# Patient Record
Sex: FEMALE | Race: WHITE | NOT HISPANIC OR LATINO
[De-identification: names, ages, dates, MRNs, and addresses within clinical notes are randomized per-mention and may not be internally consistent; named-entity substitution may affect disease eponyms.]

---

## 2017-03-31 ENCOUNTER — APPOINTMENT (OUTPATIENT)
Dept: ORTHOPEDIC SURGERY | Facility: CLINIC | Age: 42
End: 2017-03-31

## 2017-03-31 VITALS — BODY MASS INDEX: 20.14 KG/M2 | HEIGHT: 64 IN | RESPIRATION RATE: 16 BRPM | WEIGHT: 118 LBS

## 2017-03-31 DIAGNOSIS — Z78.9 OTHER SPECIFIED HEALTH STATUS: ICD-10-CM

## 2017-04-24 ENCOUNTER — APPOINTMENT (OUTPATIENT)
Dept: ORTHOPEDIC SURGERY | Facility: CLINIC | Age: 42
End: 2017-04-24

## 2017-04-24 VITALS — RESPIRATION RATE: 16 BRPM | WEIGHT: 118 LBS | BODY MASS INDEX: 20.14 KG/M2 | HEIGHT: 64 IN

## 2017-04-24 DIAGNOSIS — S62.525D NONDISPLACED FRACTURE OF DISTAL PHALANX OF LEFT THUMB, SUBSEQUENT ENCOUNTER FOR FRACTURE WITH ROUTINE HEALING: ICD-10-CM

## 2023-12-15 ENCOUNTER — ASOB RESULT (OUTPATIENT)
Age: 48
End: 2023-12-15

## 2023-12-15 ENCOUNTER — APPOINTMENT (OUTPATIENT)
Dept: ANTEPARTUM | Facility: CLINIC | Age: 48
End: 2023-12-15
Payer: COMMERCIAL

## 2023-12-15 PROCEDURE — 93976 VASCULAR STUDY: CPT

## 2023-12-15 PROCEDURE — 76813 OB US NUCHAL MEAS 1 GEST: CPT

## 2023-12-15 PROCEDURE — 36415 COLL VENOUS BLD VENIPUNCTURE: CPT

## 2024-01-17 ENCOUNTER — ASOB RESULT (OUTPATIENT)
Age: 49
End: 2024-01-17

## 2024-01-17 ENCOUNTER — APPOINTMENT (OUTPATIENT)
Dept: ANTEPARTUM | Facility: CLINIC | Age: 49
End: 2024-01-17
Payer: COMMERCIAL

## 2024-01-17 PROCEDURE — 76805 OB US >/= 14 WKS SNGL FETUS: CPT

## 2024-01-17 PROCEDURE — 76817 TRANSVAGINAL US OBSTETRIC: CPT

## 2024-02-15 ENCOUNTER — APPOINTMENT (OUTPATIENT)
Dept: ANTEPARTUM | Facility: CLINIC | Age: 49
End: 2024-02-15
Payer: COMMERCIAL

## 2024-02-15 ENCOUNTER — ASOB RESULT (OUTPATIENT)
Age: 49
End: 2024-02-15

## 2024-02-15 PROCEDURE — 76817 TRANSVAGINAL US OBSTETRIC: CPT

## 2024-02-15 PROCEDURE — 76811 OB US DETAILED SNGL FETUS: CPT

## 2024-03-18 ENCOUNTER — APPOINTMENT (OUTPATIENT)
Dept: ANTEPARTUM | Facility: CLINIC | Age: 49
End: 2024-03-18
Payer: COMMERCIAL

## 2024-03-18 ENCOUNTER — ASOB RESULT (OUTPATIENT)
Age: 49
End: 2024-03-18

## 2024-03-18 PROCEDURE — 76816 OB US FOLLOW-UP PER FETUS: CPT

## 2024-03-18 PROCEDURE — 76819 FETAL BIOPHYS PROFIL W/O NST: CPT

## 2024-03-18 PROCEDURE — 76820 UMBILICAL ARTERY ECHO: CPT | Mod: 59

## 2024-04-15 ENCOUNTER — APPOINTMENT (OUTPATIENT)
Dept: ANTEPARTUM | Facility: CLINIC | Age: 49
End: 2024-04-15
Payer: COMMERCIAL

## 2024-04-15 ENCOUNTER — ASOB RESULT (OUTPATIENT)
Age: 49
End: 2024-04-15

## 2024-04-15 PROCEDURE — 76816 OB US FOLLOW-UP PER FETUS: CPT

## 2024-04-15 PROCEDURE — 76820 UMBILICAL ARTERY ECHO: CPT | Mod: 59

## 2024-04-15 PROCEDURE — 76819 FETAL BIOPHYS PROFIL W/O NST: CPT

## 2024-05-13 ENCOUNTER — ASOB RESULT (OUTPATIENT)
Age: 49
End: 2024-05-13

## 2024-05-13 ENCOUNTER — APPOINTMENT (OUTPATIENT)
Dept: ANTEPARTUM | Facility: CLINIC | Age: 49
End: 2024-05-13
Payer: COMMERCIAL

## 2024-05-13 PROCEDURE — 76816 OB US FOLLOW-UP PER FETUS: CPT

## 2024-05-13 PROCEDURE — 76819 FETAL BIOPHYS PROFIL W/O NST: CPT

## 2024-05-13 PROCEDURE — 76820 UMBILICAL ARTERY ECHO: CPT | Mod: 59

## 2024-05-22 ENCOUNTER — APPOINTMENT (OUTPATIENT)
Dept: ANTEPARTUM | Facility: CLINIC | Age: 49
End: 2024-05-22
Payer: COMMERCIAL

## 2024-05-22 ENCOUNTER — ASOB RESULT (OUTPATIENT)
Age: 49
End: 2024-05-22

## 2024-05-22 PROCEDURE — 76820 UMBILICAL ARTERY ECHO: CPT

## 2024-05-22 PROCEDURE — 76818 FETAL BIOPHYS PROFILE W/NST: CPT

## 2024-05-22 PROCEDURE — 76815 OB US LIMITED FETUS(S): CPT

## 2024-05-29 ENCOUNTER — ASOB RESULT (OUTPATIENT)
Age: 49
End: 2024-05-29

## 2024-05-29 ENCOUNTER — APPOINTMENT (OUTPATIENT)
Dept: ANTEPARTUM | Facility: CLINIC | Age: 49
End: 2024-05-29
Payer: COMMERCIAL

## 2024-05-29 PROCEDURE — 76816 OB US FOLLOW-UP PER FETUS: CPT

## 2024-05-29 PROCEDURE — 76818 FETAL BIOPHYS PROFILE W/NST: CPT | Mod: 59

## 2024-05-29 PROCEDURE — 76820 UMBILICAL ARTERY ECHO: CPT | Mod: 59

## 2024-06-06 ENCOUNTER — ASOB RESULT (OUTPATIENT)
Age: 49
End: 2024-06-06

## 2024-06-06 ENCOUNTER — APPOINTMENT (OUTPATIENT)
Dept: ANTEPARTUM | Facility: CLINIC | Age: 49
End: 2024-06-06
Payer: COMMERCIAL

## 2024-06-06 PROCEDURE — 76815 OB US LIMITED FETUS(S): CPT

## 2024-06-06 PROCEDURE — 76820 UMBILICAL ARTERY ECHO: CPT

## 2024-06-06 PROCEDURE — 76818 FETAL BIOPHYS PROFILE W/NST: CPT

## 2024-06-10 ENCOUNTER — OUTPATIENT (OUTPATIENT)
Dept: OUTPATIENT SERVICES | Facility: HOSPITAL | Age: 49
LOS: 1 days | End: 2024-06-10
Payer: COMMERCIAL

## 2024-06-10 VITALS
RESPIRATION RATE: 18 BRPM | OXYGEN SATURATION: 100 % | HEART RATE: 59 BPM | DIASTOLIC BLOOD PRESSURE: 85 MMHG | TEMPERATURE: 98 F | SYSTOLIC BLOOD PRESSURE: 132 MMHG

## 2024-06-10 DIAGNOSIS — O26.899 OTHER SPECIFIED PREGNANCY RELATED CONDITIONS, UNSPECIFIED TRIMESTER: ICD-10-CM

## 2024-06-10 LAB
ALBUMIN SERPL ELPH-MCNC: 3.3 G/DL — SIGNIFICANT CHANGE UP (ref 3.3–5)
ALP SERPL-CCNC: 143 U/L — HIGH (ref 40–120)
ALT FLD-CCNC: 15 U/L — SIGNIFICANT CHANGE UP (ref 10–45)
ANION GAP SERPL CALC-SCNC: 12 MMOL/L — SIGNIFICANT CHANGE UP (ref 5–17)
AST SERPL-CCNC: 22 U/L — SIGNIFICANT CHANGE UP (ref 10–40)
BASOPHILS # BLD AUTO: 0.06 K/UL — SIGNIFICANT CHANGE UP (ref 0–0.2)
BASOPHILS NFR BLD AUTO: 1 % — SIGNIFICANT CHANGE UP (ref 0–2)
BILIRUB SERPL-MCNC: 0.2 MG/DL — SIGNIFICANT CHANGE UP (ref 0.2–1.2)
BUN SERPL-MCNC: 11 MG/DL — SIGNIFICANT CHANGE UP (ref 7–23)
CALCIUM SERPL-MCNC: 8.8 MG/DL — SIGNIFICANT CHANGE UP (ref 8.4–10.5)
CHLORIDE SERPL-SCNC: 102 MMOL/L — SIGNIFICANT CHANGE UP (ref 96–108)
CO2 SERPL-SCNC: 19 MMOL/L — LOW (ref 22–31)
CREAT ?TM UR-MCNC: 181 MG/DL — SIGNIFICANT CHANGE UP
CREAT SERPL-MCNC: 0.69 MG/DL — SIGNIFICANT CHANGE UP (ref 0.5–1.3)
EGFR: 107 ML/MIN/1.73M2 — SIGNIFICANT CHANGE UP
EOSINOPHIL # BLD AUTO: 0.14 K/UL — SIGNIFICANT CHANGE UP (ref 0–0.5)
EOSINOPHIL NFR BLD AUTO: 2.2 % — SIGNIFICANT CHANGE UP (ref 0–6)
FIBRINOGEN PPP-MCNC: 546 MG/DL — HIGH (ref 200–445)
GLUCOSE SERPL-MCNC: 79 MG/DL — SIGNIFICANT CHANGE UP (ref 70–99)
HCT VFR BLD CALC: 32.6 % — LOW (ref 34.5–45)
HGB BLD-MCNC: 10.7 G/DL — LOW (ref 11.5–15.5)
IMM GRANULOCYTES NFR BLD AUTO: 0.3 % — SIGNIFICANT CHANGE UP (ref 0–0.9)
LDH SERPL L TO P-CCNC: 204 U/L — SIGNIFICANT CHANGE UP (ref 50–242)
LYMPHOCYTES # BLD AUTO: 1.16 K/UL — SIGNIFICANT CHANGE UP (ref 1–3.3)
LYMPHOCYTES # BLD AUTO: 18.4 % — SIGNIFICANT CHANGE UP (ref 13–44)
MCHC RBC-ENTMCNC: 28.8 PG — SIGNIFICANT CHANGE UP (ref 27–34)
MCHC RBC-ENTMCNC: 32.8 GM/DL — SIGNIFICANT CHANGE UP (ref 32–36)
MCV RBC AUTO: 87.9 FL — SIGNIFICANT CHANGE UP (ref 80–100)
MONOCYTES # BLD AUTO: 0.51 K/UL — SIGNIFICANT CHANGE UP (ref 0–0.9)
MONOCYTES NFR BLD AUTO: 8.1 % — SIGNIFICANT CHANGE UP (ref 2–14)
NEUTROPHILS # BLD AUTO: 4.41 K/UL — SIGNIFICANT CHANGE UP (ref 1.8–7.4)
NEUTROPHILS NFR BLD AUTO: 70 % — SIGNIFICANT CHANGE UP (ref 43–77)
NRBC # BLD: 0 /100 WBCS — SIGNIFICANT CHANGE UP (ref 0–0)
PLATELET # BLD AUTO: 179 K/UL — SIGNIFICANT CHANGE UP (ref 150–400)
POTASSIUM SERPL-MCNC: 4.6 MMOL/L — SIGNIFICANT CHANGE UP (ref 3.5–5.3)
POTASSIUM SERPL-SCNC: 4.6 MMOL/L — SIGNIFICANT CHANGE UP (ref 3.5–5.3)
PROT ?TM UR-MCNC: 19 MG/DL — HIGH (ref 0–12)
PROT SERPL-MCNC: 6.6 G/DL — SIGNIFICANT CHANGE UP (ref 6–8.3)
PROT/CREAT UR-RTO: 0.1 RATIO — SIGNIFICANT CHANGE UP (ref 0–0.2)
RBC # BLD: 3.71 M/UL — LOW (ref 3.8–5.2)
RBC # FLD: 13.4 % — SIGNIFICANT CHANGE UP (ref 10.3–14.5)
SODIUM SERPL-SCNC: 133 MMOL/L — LOW (ref 135–145)
URATE SERPL-MCNC: 6.4 MG/DL — SIGNIFICANT CHANGE UP (ref 2.5–7)
WBC # BLD: 6.3 K/UL — SIGNIFICANT CHANGE UP (ref 3.8–10.5)
WBC # FLD AUTO: 6.3 K/UL — SIGNIFICANT CHANGE UP (ref 3.8–10.5)

## 2024-06-10 PROCEDURE — 99214 OFFICE O/P EST MOD 30 MIN: CPT

## 2024-06-10 PROCEDURE — 85025 COMPLETE CBC W/AUTO DIFF WBC: CPT

## 2024-06-10 PROCEDURE — 76815 OB US LIMITED FETUS(S): CPT

## 2024-06-10 PROCEDURE — 59025 FETAL NON-STRESS TEST: CPT

## 2024-06-10 PROCEDURE — 82570 ASSAY OF URINE CREATININE: CPT

## 2024-06-10 PROCEDURE — 80053 COMPREHEN METABOLIC PANEL: CPT

## 2024-06-10 PROCEDURE — 84156 ASSAY OF PROTEIN URINE: CPT

## 2024-06-10 PROCEDURE — 76818 FETAL BIOPHYS PROFILE W/NST: CPT

## 2024-06-10 PROCEDURE — 36415 COLL VENOUS BLD VENIPUNCTURE: CPT

## 2024-06-10 PROCEDURE — 84550 ASSAY OF BLOOD/URIC ACID: CPT

## 2024-06-10 PROCEDURE — 83615 LACTATE (LD) (LDH) ENZYME: CPT

## 2024-06-10 PROCEDURE — 85384 FIBRINOGEN ACTIVITY: CPT

## 2024-06-10 RX ORDER — VALACYCLOVIR 500 MG/1
1 TABLET, FILM COATED ORAL
Refills: 0 | DISCHARGE

## 2024-06-10 NOTE — OB RN TRIAGE NOTE - FALL HARM RISK - UNIVERSAL INTERVENTIONS
Bed in lowest position, wheels locked, appropriate side rails in place/Call bell, personal items and telephone in reach/Instruct patient to call for assistance before getting out of bed or chair/Non-slip footwear when patient is out of bed/[place] to call system/Physically safe environment - no spills, clutter or unnecessary equipment/Purposeful Proactive Rounding/Room/bathroom lighting operational, light cord in reach

## 2024-06-10 NOTE — OB PROVIDER TRIAGE NOTE - HISTORY OF PRESENT ILLNESS
LINNETTE ZEPEDA is a 50yo G2Po at 38w4d presenting for *.     Ante: Spontaneous conception. NIPT LR. Anatomy scan wnl. Passed GCT. Denies elevated BP. GBS *. EFW *     OBHx: G  GYNHx: denies abnormal pap, STI, fibroids, ovarian cysts  PMHx: denies  meds: PNV  PSH: ***  allergies:     PE:  T(C): 36.8 (06-10-24 @ 19:09), Max: 36.8 (06-10-24 @ 19:09)  HR: 59 (06-10-24 @ 19:09) (59 - 59)  BP: 132/85 (06-10-24 @ 19:09) (132/85 - 132/85)  RR: 18 (06-10-24 @ 19:09) (18 - 18)  SpO2: 100% (06-10-24 @ 19:09) (100% - 100%)  GEN: well-appearing, NAD  PULM: no increased WOB  ABD: soft, nontender, gravid  EXT: mild LE edema  TAUS: ***  ultrasound printed and attached to chart   SVE: ***    FHT: baseline 135, moderate variability, +accels, no decels  TOCO: ctx q4min (not feeling them)  reactive & reassuring       A&P: 50yo G* at * presents *. Fetal status reassuring.   -     D/W    D/W   LINNETTE ZEPEDA is a 48yo G2Po at 38w4d presenting for headache and nausea. The nausea has been ongoing throughout pregnancy, unchanged. Headache is 3/10.   She denies VB. Endorses non-painful [place]-Vasquez contractions. Denies LOF. Endorses FM.    Ante: Spontaneous conception. NIPT LR. Anatomy scan wnl. Passed GCT. Denies elevated BP. GBS *. EFW *     OBHx: G1 MAB 8wks 2021. G2 current  GYNHx: abnormal papx1 10yrs ago, resolved, STI, fibroids, ovarian cysts  PMHx: denies  meds: PNV  PSH: none   allergies: sulfa (hives)    PE:  T(C): 36.8 (06-10-24 @ 19:09), Max: 36.8 (06-10-24 @ 19:09)  HR: 59 (06-10-24 @ 19:09) (59 - 59)  BP: 132/85 (06-10-24 @ 19:09) (132/85 - 132/85)  RR: 18 (06-10-24 @ 19:09) (18 - 18)  SpO2: 100% (06-10-24 @ 19:09) (100% - 100%)  GEN: well-appearing, NAD  PULM: no increased WOB  ABD: soft, nontender, gravid  EXT: mild LE edema  TAUS: ***  ultrasound printed and attached to chart   SVE: ***    FHT: baseline 135, moderate variability, +accels, no decels  TOCO: ctx q4min (not feeling them)  reactive & reassuring       A&P: 48yo G* at * presents *. Fetal status reassuring.   -     D/W    D/W   LINNETTE ZEPEDA is a 50yo G2Po at 38w4d presenting for headache and nausea. The nausea has been ongoing throughout pregnancy, unchanged. She had a headache that started two days ago, at worst 3/10, that resolved after taking t tylenol. She currently neither has headache or nausea and is feeling her normal self. She denies emesis. Denies scotoma, CP, shortness of breath, RUQ pain.  She denies VB. Endorses non-painful [place]-Vasquez contractions. Denies LOF. Endorses FM.    Ante: Spontaneous conception. NIPT LR. Anatomy scan wnl. Passed GCT. Denies elevated BP. GBS+. EFW 2798g on 5/29 (30%)     OBHx: G1 MAB 8wks 2021. G2 current  GYNHx: abnormal papx1 10yrs ago, resolved, STI, fibroids, ovarian cysts  PMHx: denies  meds: PNV  PSH: none   allergies: sulfa (hives)    PE:  T(C): 36.8 (06-10-24 @ 19:09), Max: 36.8 (06-10-24 @ 19:09)  HR: 59 (06-10-24 @ 19:09) (59 - 59)  BP: 132/85 (06-10-24 @ 19:09) (132/85 - 132/85)  RR: 18 (06-10-24 @ 19:09) (18 - 18)  SpO2: 100% (06-10-24 @ 19:09) (100% - 100%)  GEN: well-appearing, NAD  PULM: no increased WOB  ABD: soft, nontender, gravid  EXT: mild LE edema  TAUS: ***  ultrasound printed and attached to chart   SVE: ***    FHT: baseline 135, moderate variability, +accels, no decels  TOCO: ctx q4min (not feeling them)  reactive & reassuring       A&P: 50yo G* at * presents *. Fetal status reassuring.   -     D/W    D/W   LINNETTE ZEPEDA is a 50yo G2Po at 38w4d presenting for headache and nausea on Saturday (two days ago). The nausea has been ongoing throughout pregnancy, and has resolved today. She just ate energy bar in triage with no nausea. She had a headache that started two days ago, at worst 3/10, that resolved after taking tylenol. She currently neither has headache or nausea and is feeling her normal self. She denies emesis. Denies scotoma, CP, shortness of breath, RUQ pain.  She denies VB. Endorses non-painful [place]-Vasquez contractions. Denies LOF. Endorses FM.    Ante: IVF pregnancy, donor egg (age 27), PGS testing normal. NIPT LR. Anatomy scan wnl. Passed GCT. Denies elevated BP. GBS+. EFW 2798g on 5/29 (30%)     OBHx: G1 MAB 8wks 2021. G2 current  GYNHx: abnormal papx1 10yrs ago, resolved, STI, fibroids, ovarian cysts  PMHx: denies  meds: PNV  PSH: none   allergies: sulfa (hives)    PE:  T(C): 36.8 (06-10-24 @ 19:09), Max: 36.8 (06-10-24 @ 19:09)  HR: 59 (06-10-24 @ 19:09) (59 - 59)  BP: 132/85 (06-10-24 @ 19:09) (132/85 - 132/85)  RR: 18 (06-10-24 @ 19:09) (18 - 18)  SpO2: 100% (06-10-24 @ 19:09) (100% - 100%)  GEN: well-appearing, NAD  PULM: no increased WOB  ABD: soft, nontender, gravid  EXT: mild LE edema  TAUS: vertex, BPP 8/8, WHITLEY 10.9cm, posterior placenta  ultrasound printed and attached to chart   SVE: deferred    FHT: baseline 135, moderate variability, +accels, no decels  TOCO: ctx q4min (not feeling them)  reactive & reassuring       A&P: 50yo G2Po at 38w4d presenting for headache and nausea on Saturday (two days ago). All of her symptoms resolved prior to presenting to triage. She has no symptoms. Full PEC labs sent and wnl. Patient is normotensive here and has no history of elevated blood pressure. Fetal status reassuring.   - safe to d/c home   - continue to hydrate, tylenol PRN for headache  - reviewed return precautions including S/S of preeclampsia  - pt has appt with primary OB tomorrow  - all questions answered     D/W Dr. Arzola PGY3   D/W Dr. Andrade attending physician  LINNETTE ZEPEDA is a 50yo G2Po at 38w4d presenting for headache and nausea on Saturday (two days ago). The nausea has been ongoing throughout pregnancy, and has resolved today. She just ate energy bar in triage with no nausea. She had a headache that started two days ago, at worst 3/10, that resolved after taking tylenol. She currently neither has headache or nausea and is feeling her normal self. She denies emesis. Denies scotoma, CP, shortness of breath, RUQ pain.  She denies VB. Endorses non-painful [place]-Vasquez contractions. Denies LOF. Endorses FM.    Ante: IVF pregnancy, donor egg (age 27), PGS testing normal. NIPT LR. Anatomy scan wnl. Passed GCT. Denies elevated BP. GBS+. EFW 2798g on 5/29 (30%)     OBHx: G1 MAB 8wks 2021. G2 current  GYNHx: abnormal papx1 10yrs ago, resolved, STI, fibroids, ovarian cysts  PMHx: denies  meds: PNV  PSH: none   allergies: sulfa (hives)    PE:  T(C): 36.8 (06-10-24 @ 19:09), Max: 36.8 (06-10-24 @ 19:09)  HR: 59 (06-10-24 @ 19:09) (59 - 59)  BP: 132/85 (06-10-24 @ 19:09) (132/85 - 132/85)  RR: 18 (06-10-24 @ 19:09) (18 - 18)  SpO2: 100% (06-10-24 @ 19:09) (100% - 100%)  GEN: well-appearing, NAD  PULM: no increased WOB  ABD: soft, nontender, gravid  EXT: mild LE edema  TAUS: vertex, BPP 8/8, WHITLEY 10.9cm, posterior placenta  ultrasound printed and attached to chart   SVE: deferred    FHT: baseline 135, moderate variability, +accels, no decels  TOCO: ctx q4min (not feeling them)  reactive & reassuring       A&P: 50yo G2Po at 38w4d presenting for headache and nausea on Saturday (two days ago). All of her symptoms resolved prior to presenting to triage. She has no symptoms. Full PEC labs sent and wnl, P:C 0.1. Patient is normotensive here and has no history of elevated blood pressure. Fetal status reassuring.   - safe to d/c home   - continue to hydrate, tylenol PRN for headache  - purchase BP cuff and monitor 1-2x per day at home   - reviewed return precautions including S/S of preeclampsia  - pt has appt with primary OB tomorrow  - all questions answered     D/W Dr. Arzola PGY3   D/W Dr. Andrade attending physician

## 2024-06-12 DIAGNOSIS — R11.0 NAUSEA: ICD-10-CM

## 2024-06-12 DIAGNOSIS — R51.9 HEADACHE, UNSPECIFIED: ICD-10-CM

## 2024-06-12 DIAGNOSIS — O09.523 SUPERVISION OF ELDERLY MULTIGRAVIDA, THIRD TRIMESTER: ICD-10-CM

## 2024-06-12 DIAGNOSIS — O09.293 SUPERVISION OF PREGNANCY WITH OTHER POOR REPRODUCTIVE OR OBSTETRIC HISTORY, THIRD TRIMESTER: ICD-10-CM

## 2024-06-12 DIAGNOSIS — O26.893 OTHER SPECIFIED PREGNANCY RELATED CONDITIONS, THIRD TRIMESTER: ICD-10-CM

## 2024-06-12 DIAGNOSIS — Z3A.38 38 WEEKS GESTATION OF PREGNANCY: ICD-10-CM

## 2024-06-14 ENCOUNTER — ASOB RESULT (OUTPATIENT)
Age: 49
End: 2024-06-14

## 2024-06-14 ENCOUNTER — APPOINTMENT (OUTPATIENT)
Dept: ANTEPARTUM | Facility: CLINIC | Age: 49
End: 2024-06-14
Payer: COMMERCIAL

## 2024-06-14 PROCEDURE — 76816 OB US FOLLOW-UP PER FETUS: CPT

## 2024-06-14 PROCEDURE — 76818 FETAL BIOPHYS PROFILE W/NST: CPT

## 2024-06-14 PROCEDURE — 76820 UMBILICAL ARTERY ECHO: CPT | Mod: 59

## 2024-06-15 PROBLEM — Z78.9 OTHER SPECIFIED HEALTH STATUS: Chronic | Status: ACTIVE | Noted: 2024-06-10

## 2024-06-18 ENCOUNTER — ASOB RESULT (OUTPATIENT)
Age: 49
End: 2024-06-18

## 2024-06-18 ENCOUNTER — APPOINTMENT (OUTPATIENT)
Dept: ANTEPARTUM | Facility: CLINIC | Age: 49
End: 2024-06-18
Payer: COMMERCIAL

## 2024-06-18 PROCEDURE — 76818 FETAL BIOPHYS PROFILE W/NST: CPT

## 2024-06-18 PROCEDURE — 76815 OB US LIMITED FETUS(S): CPT

## 2024-06-18 PROCEDURE — 76820 UMBILICAL ARTERY ECHO: CPT

## 2024-06-19 ENCOUNTER — INPATIENT (INPATIENT)
Facility: HOSPITAL | Age: 49
LOS: 2 days | Discharge: HOME | DRG: 833 | End: 2024-06-22
Attending: OBSTETRICS & GYNECOLOGY | Admitting: OBSTETRICS & GYNECOLOGY
Payer: COMMERCIAL

## 2024-06-19 VITALS
TEMPERATURE: 98 F | HEIGHT: 64 IN | HEART RATE: 71 BPM | DIASTOLIC BLOOD PRESSURE: 79 MMHG | OXYGEN SATURATION: 99 % | WEIGHT: 128.97 LBS | SYSTOLIC BLOOD PRESSURE: 129 MMHG | RESPIRATION RATE: 16 BRPM

## 2024-06-19 DIAGNOSIS — O26.899 OTHER SPECIFIED PREGNANCY RELATED CONDITIONS, UNSPECIFIED TRIMESTER: ICD-10-CM

## 2024-06-19 LAB
ALBUMIN SERPL ELPH-MCNC: 3.4 G/DL — SIGNIFICANT CHANGE UP (ref 3.3–5)
ALP SERPL-CCNC: 157 U/L — HIGH (ref 40–120)
ALT FLD-CCNC: 36 U/L — SIGNIFICANT CHANGE UP (ref 10–45)
ANION GAP SERPL CALC-SCNC: 13 MMOL/L — SIGNIFICANT CHANGE UP (ref 5–17)
APPEARANCE UR: ABNORMAL
AST SERPL-CCNC: 40 U/L — SIGNIFICANT CHANGE UP (ref 10–40)
BASOPHILS # BLD AUTO: 0.08 K/UL — SIGNIFICANT CHANGE UP (ref 0–0.2)
BASOPHILS NFR BLD AUTO: 0.8 % — SIGNIFICANT CHANGE UP (ref 0–2)
BILIRUB SERPL-MCNC: 0.2 MG/DL — SIGNIFICANT CHANGE UP (ref 0.2–1.2)
BILIRUB UR-MCNC: NEGATIVE — SIGNIFICANT CHANGE UP
BLD GP AB SCN SERPL QL: NEGATIVE — SIGNIFICANT CHANGE UP
BUN SERPL-MCNC: 12 MG/DL — SIGNIFICANT CHANGE UP (ref 7–23)
CALCIUM SERPL-MCNC: 9.1 MG/DL — SIGNIFICANT CHANGE UP (ref 8.4–10.5)
CHLORIDE SERPL-SCNC: 101 MMOL/L — SIGNIFICANT CHANGE UP (ref 96–108)
CO2 SERPL-SCNC: 20 MMOL/L — LOW (ref 22–31)
COLOR SPEC: YELLOW — SIGNIFICANT CHANGE UP
CREAT SERPL-MCNC: 0.84 MG/DL — SIGNIFICANT CHANGE UP (ref 0.5–1.3)
DIFF PNL FLD: NEGATIVE — SIGNIFICANT CHANGE UP
EGFR: 86 ML/MIN/1.73M2 — SIGNIFICANT CHANGE UP
EGFR: 86 ML/MIN/1.73M2 — SIGNIFICANT CHANGE UP
EOSINOPHIL # BLD AUTO: 0.09 K/UL — SIGNIFICANT CHANGE UP (ref 0–0.5)
EOSINOPHIL NFR BLD AUTO: 0.9 % — SIGNIFICANT CHANGE UP (ref 0–6)
FIBRINOGEN PPP-MCNC: 308 MG/DL — SIGNIFICANT CHANGE UP (ref 200–445)
GLUCOSE SERPL-MCNC: 93 MG/DL — SIGNIFICANT CHANGE UP (ref 70–99)
GLUCOSE UR QL: NEGATIVE MG/DL — SIGNIFICANT CHANGE UP
HCT VFR BLD CALC: 36 % — SIGNIFICANT CHANGE UP (ref 34.5–45)
HGB BLD-MCNC: 11.6 G/DL — SIGNIFICANT CHANGE UP (ref 11.5–15.5)
IMM GRANULOCYTES NFR BLD AUTO: 0.4 % — SIGNIFICANT CHANGE UP (ref 0–0.9)
KETONES UR-MCNC: 40 MG/DL
LDH SERPL L TO P-CCNC: 212 U/L — SIGNIFICANT CHANGE UP (ref 50–242)
LEUKOCYTE ESTERASE UR-ACNC: NEGATIVE — SIGNIFICANT CHANGE UP
LYMPHOCYTES # BLD AUTO: 1.04 K/UL — SIGNIFICANT CHANGE UP (ref 1–3.3)
LYMPHOCYTES # BLD AUTO: 10 % — LOW (ref 13–44)
MCHC RBC-ENTMCNC: 28.5 PG — SIGNIFICANT CHANGE UP (ref 27–34)
MCHC RBC-ENTMCNC: 32.2 GM/DL — SIGNIFICANT CHANGE UP (ref 32–36)
MCV RBC AUTO: 88.5 FL — SIGNIFICANT CHANGE UP (ref 80–100)
MONOCYTES # BLD AUTO: 0.59 K/UL — SIGNIFICANT CHANGE UP (ref 0–0.9)
MONOCYTES NFR BLD AUTO: 5.7 % — SIGNIFICANT CHANGE UP (ref 2–14)
NEUTROPHILS # BLD AUTO: 8.56 K/UL — HIGH (ref 1.8–7.4)
NEUTROPHILS NFR BLD AUTO: 82.2 % — HIGH (ref 43–77)
NITRITE UR-MCNC: NEGATIVE — SIGNIFICANT CHANGE UP
NRBC # BLD: 0 /100 WBCS — SIGNIFICANT CHANGE UP (ref 0–0)
NRBC BLD-RTO: 0 /100 WBCS — SIGNIFICANT CHANGE UP (ref 0–0)
PH UR: 5.5 — SIGNIFICANT CHANGE UP (ref 5–8)
PLATELET # BLD AUTO: 209 K/UL — SIGNIFICANT CHANGE UP (ref 150–400)
POTASSIUM SERPL-MCNC: 4.4 MMOL/L — SIGNIFICANT CHANGE UP (ref 3.5–5.3)
POTASSIUM SERPL-SCNC: 4.4 MMOL/L — SIGNIFICANT CHANGE UP (ref 3.5–5.3)
PROT SERPL-MCNC: 7 G/DL — SIGNIFICANT CHANGE UP (ref 6–8.3)
PROT UR-MCNC: NEGATIVE MG/DL — SIGNIFICANT CHANGE UP
RBC # BLD: 4.07 M/UL — SIGNIFICANT CHANGE UP (ref 3.8–5.2)
RBC # FLD: 13.9 % — SIGNIFICANT CHANGE UP (ref 10.3–14.5)
RH IG SCN BLD-IMP: POSITIVE — SIGNIFICANT CHANGE UP
RH IG SCN BLD-IMP: POSITIVE — SIGNIFICANT CHANGE UP
SODIUM SERPL-SCNC: 134 MMOL/L — LOW (ref 135–145)
SP GR SPEC: 1.03 — SIGNIFICANT CHANGE UP (ref 1–1.03)
URATE SERPL-MCNC: 7.5 MG/DL — HIGH (ref 2.5–7)
UROBILINOGEN FLD QL: 1 MG/DL — SIGNIFICANT CHANGE UP (ref 0.2–1)
WBC # BLD: 10.4 K/UL — SIGNIFICANT CHANGE UP (ref 3.8–10.5)
WBC # FLD AUTO: 10.4 K/UL — SIGNIFICANT CHANGE UP (ref 3.8–10.5)

## 2024-06-19 RX ORDER — OXYTOCIN-SODIUM CHLORIDE 0.9% IV SOLN 30 UNIT/500ML 30-0.9/5 UT/ML-%
333.33 SOLUTION INTRAVENOUS
Qty: 20 | Refills: 0 | Status: DISCONTINUED | OUTPATIENT
Start: 2024-06-19 | End: 2024-06-20

## 2024-06-19 RX ORDER — CEFAZOLIN SODIUM IN 0.9 % NACL 3 G/100 ML
2000 INTRAVENOUS SOLUTION, PIGGYBACK (ML) INTRAVENOUS EVERY 8 HOURS
Refills: 0 | Status: DISCONTINUED | OUTPATIENT
Start: 2024-06-19 | End: 2024-06-20

## 2024-06-19 RX ORDER — OXYTOCIN-SODIUM CHLORIDE 0.9% IV SOLN 30 UNIT/500ML 30-0.9/5 UT/ML-%
2 SOLUTION INTRAVENOUS
Qty: 30 | Refills: 0 | Status: DISCONTINUED | OUTPATIENT
Start: 2024-06-19 | End: 2024-06-20

## 2024-06-19 RX ORDER — SODIUM CHLORIDE 9 G/1000ML
1000 INJECTION, SOLUTION INTRAVENOUS
Refills: 0 | Status: DISCONTINUED | OUTPATIENT
Start: 2024-06-19 | End: 2024-06-20

## 2024-06-19 RX ORDER — ACETAMINOPHEN 500 MG/5ML
1000 LIQUID (ML) ORAL ONCE
Refills: 0 | Status: COMPLETED | OUTPATIENT
Start: 2024-06-19 | End: 2024-06-19

## 2024-06-19 RX ORDER — CITRIC ACID/SODIUM CITRATE 300-500 MG
15 SOLUTION, ORAL ORAL EVERY 6 HOURS
Refills: 0 | Status: DISCONTINUED | OUTPATIENT
Start: 2024-06-19 | End: 2024-06-20

## 2024-06-19 RX ADMIN — Medication 400 MILLIGRAM(S): at 21:40

## 2024-06-19 RX ADMIN — Medication 100 MILLIGRAM(S): at 22:46

## 2024-06-19 RX ADMIN — Medication 1000 MILLIGRAM(S): at 22:23

## 2024-06-19 RX ADMIN — SODIUM CHLORIDE 125 MILLILITER(S): 9 INJECTION, SOLUTION INTRAVENOUS at 21:49

## 2024-06-19 RX ADMIN — OXYTOCIN-SODIUM CHLORIDE 0.9% IV SOLN 30 UNIT/500ML 2 MILLIUNIT(S)/MIN: 30-0.9/5 SOLUTION at 23:01

## 2024-06-20 LAB
CREAT ?TM UR-MCNC: 149 MG/DL — SIGNIFICANT CHANGE UP
PROT ?TM UR-MCNC: 19 MG/DL — HIGH (ref 0–12)
PROT/CREAT UR-RTO: 0.1 RATIO — SIGNIFICANT CHANGE UP (ref 0–0.2)
T PALLIDUM AB TITR SER: NEGATIVE — SIGNIFICANT CHANGE UP

## 2024-06-20 RX ORDER — WITCH HAZEL LEAF
1 FLUID EXTRACT MISCELLANEOUS EVERY 4 HOURS
Refills: 0 | Status: DISCONTINUED | OUTPATIENT
Start: 2024-06-20 | End: 2024-06-22

## 2024-06-20 RX ORDER — BENZOCAINE 220 MG/G
1 SPRAY, METERED PERIODONTAL EVERY 6 HOURS
Refills: 0 | Status: DISCONTINUED | OUTPATIENT
Start: 2024-06-20 | End: 2024-06-22

## 2024-06-20 RX ORDER — OXYCODONE HYDROCHLORIDE 30 MG/1
5 TABLET ORAL
Refills: 0 | Status: DISCONTINUED | OUTPATIENT
Start: 2024-06-20 | End: 2024-06-22

## 2024-06-20 RX ORDER — CEFAZOLIN SODIUM IN 0.9 % NACL 3 G/100 ML
2000 INTRAVENOUS SOLUTION, PIGGYBACK (ML) INTRAVENOUS ONCE
Refills: 0 | Status: COMPLETED | OUTPATIENT
Start: 2024-06-20 | End: 2024-06-20

## 2024-06-20 RX ORDER — CEFAZOLIN SODIUM IN 0.9 % NACL 3 G/100 ML
1000 INTRAVENOUS SOLUTION, PIGGYBACK (ML) INTRAVENOUS EVERY 8 HOURS
Refills: 0 | Status: DISCONTINUED | OUTPATIENT
Start: 2024-06-20 | End: 2024-06-20

## 2024-06-20 RX ORDER — KETOROLAC TROMETHAMINE 30 MG/ML
30 INJECTION, SOLUTION INTRAMUSCULAR; INTRAVENOUS ONCE
Refills: 0 | Status: DISCONTINUED | OUTPATIENT
Start: 2024-06-20 | End: 2024-06-22

## 2024-06-20 RX ORDER — OXYCODONE HYDROCHLORIDE 30 MG/1
5 TABLET ORAL ONCE
Refills: 0 | Status: DISCONTINUED | OUTPATIENT
Start: 2024-06-20 | End: 2024-06-22

## 2024-06-20 RX ORDER — OXYTOCIN-SODIUM CHLORIDE 0.9% IV SOLN 30 UNIT/500ML 30-0.9/5 UT/ML-%
41.67 SOLUTION INTRAVENOUS
Qty: 20 | Refills: 0 | Status: DISCONTINUED | OUTPATIENT
Start: 2024-06-20 | End: 2024-06-22

## 2024-06-20 RX ORDER — DIPHENHYDRAMINE HCL 12.5MG/5ML
25 ELIXIR ORAL EVERY 6 HOURS
Refills: 0 | Status: COMPLETED | OUTPATIENT
Start: 2024-06-20 | End: 2025-05-19

## 2024-06-20 RX ORDER — IBUPROFEN 200 MG
600 TABLET ORAL EVERY 6 HOURS
Refills: 0 | Status: DISCONTINUED | OUTPATIENT
Start: 2024-06-20 | End: 2024-06-22

## 2024-06-20 RX ORDER — MAGNESIUM HYDROXIDE 400 MG/5ML
30 SUSPENSION ORAL
Refills: 0 | Status: DISCONTINUED | OUTPATIENT
Start: 2024-06-20 | End: 2024-06-22

## 2024-06-20 RX ORDER — FENTANYL/BUPIVACAINE/NS/PF 2MCG/ML-.1
250 PLASTIC BAG, INJECTION (ML) INJECTION
Refills: 0 | Status: DISCONTINUED | OUTPATIENT
Start: 2024-06-20 | End: 2024-06-20

## 2024-06-20 RX ORDER — DIBUCAINE 10 MG/G
1 OINTMENT TOPICAL EVERY 6 HOURS
Refills: 0 | Status: DISCONTINUED | OUTPATIENT
Start: 2024-06-20 | End: 2024-06-22

## 2024-06-20 RX ORDER — ACETAMINOPHEN 500 MG/5ML
975 LIQUID (ML) ORAL
Refills: 0 | Status: DISCONTINUED | OUTPATIENT
Start: 2024-06-20 | End: 2024-06-22

## 2024-06-20 RX ORDER — PRAMOXINE HCL 1 %
1 GEL (GRAM) TOPICAL EVERY 4 HOURS
Refills: 0 | Status: DISCONTINUED | OUTPATIENT
Start: 2024-06-20 | End: 2024-06-22

## 2024-06-20 RX ORDER — MODIFIED LANOLIN 100 %
1 CREAM (GRAM) TOPICAL EVERY 6 HOURS
Refills: 0 | Status: DISCONTINUED | OUTPATIENT
Start: 2024-06-20 | End: 2024-06-22

## 2024-06-20 RX ORDER — DEXAMETHASONE 0.5 MG/1
4 TABLET ORAL EVERY 6 HOURS
Refills: 0 | Status: DISCONTINUED | OUTPATIENT
Start: 2024-06-20 | End: 2024-06-20

## 2024-06-20 RX ORDER — HYDROCORTISONE 10 MG/G
1 CREAM TOPICAL EVERY 6 HOURS
Refills: 0 | Status: DISCONTINUED | OUTPATIENT
Start: 2024-06-20 | End: 2024-06-22

## 2024-06-20 RX ORDER — PRENATAL 136/IRON/FOLIC ACID 27 MG-1 MG
1 TABLET ORAL DAILY
Refills: 0 | Status: DISCONTINUED | OUTPATIENT
Start: 2024-06-20 | End: 2024-06-22

## 2024-06-20 RX ORDER — NALOXONE HYDROCHLORIDE 0.4 MG/ML
0.1 INJECTION, SOLUTION INTRAMUSCULAR; INTRAVENOUS; SUBCUTANEOUS
Refills: 0 | Status: DISCONTINUED | OUTPATIENT
Start: 2024-06-20 | End: 2024-06-20

## 2024-06-20 RX ORDER — ONDANSETRON HCL/PF 4 MG/2 ML
4 VIAL (ML) INJECTION EVERY 6 HOURS
Refills: 0 | Status: DISCONTINUED | OUTPATIENT
Start: 2024-06-20 | End: 2024-06-20

## 2024-06-20 RX ORDER — IBUPROFEN 200 MG
600 TABLET ORAL EVERY 6 HOURS
Refills: 0 | Status: COMPLETED | OUTPATIENT
Start: 2024-06-20 | End: 2025-05-19

## 2024-06-20 RX ORDER — SIMETHICONE 80 MG
80 TABLET,CHEWABLE ORAL EVERY 4 HOURS
Refills: 0 | Status: DISCONTINUED | OUTPATIENT
Start: 2024-06-20 | End: 2024-06-22

## 2024-06-20 RX ADMIN — Medication 1 APPLICATION(S): at 20:07

## 2024-06-20 RX ADMIN — Medication 600 MILLIGRAM(S): at 23:49

## 2024-06-20 RX ADMIN — Medication 1 APPLICATION(S): at 20:06

## 2024-06-20 RX ADMIN — DIBUCAINE 1 APPLICATION(S): 10 OINTMENT TOPICAL at 20:08

## 2024-06-20 RX ADMIN — Medication 975 MILLIGRAM(S): at 20:52

## 2024-06-20 RX ADMIN — BENZOCAINE 1 SPRAY(S): 220 SPRAY, METERED PERIODONTAL at 20:08

## 2024-06-20 RX ADMIN — HYDROCORTISONE 1 APPLICATION(S): 10 CREAM TOPICAL at 20:06

## 2024-06-20 RX ADMIN — Medication 100 MILLIGRAM(S): at 07:14

## 2024-06-20 RX ADMIN — Medication 3 MILLILITER(S): at 23:00

## 2024-06-20 RX ADMIN — Medication 100 MILLIGRAM(S): at 14:39

## 2024-06-21 ENCOUNTER — TRANSCRIPTION ENCOUNTER (OUTPATIENT)
Age: 49
End: 2024-06-21

## 2024-06-21 RX ORDER — IBUPROFEN 200 MG
1 TABLET ORAL
Qty: 0 | Refills: 0 | DISCHARGE
Start: 2024-06-21

## 2024-06-21 RX ORDER — DIPHENHYDRAMINE HCL 12.5MG/5ML
25 ELIXIR ORAL EVERY 6 HOURS
Refills: 0 | Status: DISCONTINUED | OUTPATIENT
Start: 2024-06-21 | End: 2024-06-22

## 2024-06-21 RX ORDER — ACETAMINOPHEN 500 MG/5ML
3 LIQUID (ML) ORAL
Qty: 0 | Refills: 0 | DISCHARGE
Start: 2024-06-21

## 2024-06-21 RX ADMIN — Medication 25 MILLIGRAM(S): at 01:19

## 2024-06-21 RX ADMIN — Medication 975 MILLIGRAM(S): at 09:04

## 2024-06-21 RX ADMIN — Medication 975 MILLIGRAM(S): at 09:40

## 2024-06-21 RX ADMIN — Medication 600 MILLIGRAM(S): at 18:02

## 2024-06-21 RX ADMIN — Medication 600 MILLIGRAM(S): at 14:58

## 2024-06-21 RX ADMIN — Medication 600 MILLIGRAM(S): at 12:56

## 2024-06-21 RX ADMIN — Medication 3 MILLILITER(S): at 21:00

## 2024-06-21 RX ADMIN — BENZOCAINE 1 SPRAY(S): 220 SPRAY, METERED PERIODONTAL at 21:47

## 2024-06-21 RX ADMIN — Medication 3 MILLILITER(S): at 14:58

## 2024-06-21 RX ADMIN — Medication 600 MILLIGRAM(S): at 06:12

## 2024-06-21 RX ADMIN — MAGNESIUM HYDROXIDE 30 MILLILITER(S): 400 SUSPENSION ORAL at 21:47

## 2024-06-22 VITALS
OXYGEN SATURATION: 98 % | TEMPERATURE: 98 F | RESPIRATION RATE: 18 BRPM | HEART RATE: 72 BPM | SYSTOLIC BLOOD PRESSURE: 112 MMHG | DIASTOLIC BLOOD PRESSURE: 64 MMHG

## 2024-06-22 PROCEDURE — 86780 TREPONEMA PALLIDUM: CPT

## 2024-06-22 PROCEDURE — 82570 ASSAY OF URINE CREATININE: CPT

## 2024-06-22 PROCEDURE — 85025 COMPLETE CBC W/AUTO DIFF WBC: CPT

## 2024-06-22 PROCEDURE — 86850 RBC ANTIBODY SCREEN: CPT

## 2024-06-22 PROCEDURE — 59050 FETAL MONITOR W/REPORT: CPT

## 2024-06-22 PROCEDURE — 84156 ASSAY OF PROTEIN URINE: CPT

## 2024-06-22 PROCEDURE — 36415 COLL VENOUS BLD VENIPUNCTURE: CPT

## 2024-06-22 PROCEDURE — 86901 BLOOD TYPING SEROLOGIC RH(D): CPT

## 2024-06-22 PROCEDURE — 85384 FIBRINOGEN ACTIVITY: CPT

## 2024-06-22 PROCEDURE — 83615 LACTATE (LD) (LDH) ENZYME: CPT

## 2024-06-22 PROCEDURE — 86900 BLOOD TYPING SEROLOGIC ABO: CPT

## 2024-06-22 PROCEDURE — 84550 ASSAY OF BLOOD/URIC ACID: CPT

## 2024-06-22 PROCEDURE — 80053 COMPREHEN METABOLIC PANEL: CPT

## 2024-06-22 RX ADMIN — Medication 600 MILLIGRAM(S): at 06:10

## 2024-06-26 DIAGNOSIS — Z3A.39 39 WEEKS GESTATION OF PREGNANCY: ICD-10-CM

## 2024-06-26 DIAGNOSIS — Z28.09 IMMUNIZATION NOT CARRIED OUT BECAUSE OF OTHER CONTRAINDICATION: ICD-10-CM

## 2024-06-26 DIAGNOSIS — Z91.010 ALLERGY TO PEANUTS: ICD-10-CM

## 2024-06-26 DIAGNOSIS — Z88.2 ALLERGY STATUS TO SULFONAMIDES: ICD-10-CM

## 2024-06-26 DIAGNOSIS — Z88.0 ALLERGY STATUS TO PENICILLIN: ICD-10-CM

## 2025-07-29 ENCOUNTER — APPOINTMENT (OUTPATIENT)
Dept: ULTRASOUND IMAGING | Facility: CLINIC | Age: 50
End: 2025-07-29
Payer: COMMERCIAL

## 2025-07-29 ENCOUNTER — OUTPATIENT (OUTPATIENT)
Dept: OUTPATIENT SERVICES | Facility: HOSPITAL | Age: 50
LOS: 1 days | End: 2025-07-29

## 2025-07-29 PROCEDURE — 76641 ULTRASOUND BREAST COMPLETE: CPT | Mod: 26,50
